# Patient Record
(demographics unavailable — no encounter records)

---

## 2024-10-15 NOTE — IMMUNIZATIONS
[Immunizations are up to date] : Immunizations are up to date [Records maintained by [facility]] : Records maintained by [facility]

## 2024-10-15 NOTE — CONSULT LETTER
[Dear  ___] : Dear ~EVIN, [Courtesy Letter:] : I had the pleasure of seeing your patient, [unfilled], in my office today. [Please see my note below.] : Please see my note below. [Consult Closing:] : Thank you very much for allowing me to participate in the care of this patient.  If you have any questions, please do not hesitate to contact me. [Sincerely,] : Sincerely, [FreeTextEntry2] : Jaylon Peck [address], [place] [address] [FreeTextEntry3] : Connie Sloan MD Attending Physician, Pediatric Rheumatology [facility] | [facility]

## 2024-10-15 NOTE — PHYSICAL EXAM
[PERRLA] : SHANON [S1, S2 Present] : S1, S2 present [Clear to auscultation] : clear to auscultation [Soft] : soft [NonTender] : non tender [Non Distended] : non distended [Normal Bowel Sounds] : normal bowel sounds [No Hepatosplenomegaly] : no hepatosplenomegaly [No Abnormal Lymph Nodes Palpated] : no abnormal lymph nodes palpated [Range Of Motion] : full range of motion [Intact Judgement] : intact judgement  [Insight Insight] : intact insight [Cranial nerves grossly intact] : cranial nerves grossly intact [Not Examined] : not examined [Acute distress] : no acute distress [Erythematous Conjunctiva] : nonerythematous conjunctiva [Erythematous Oropharynx] : nonerythematous oropharynx [Lesions] : no lesions [Murmurs] : no murmurs [Joint effusions] : no joint effusions [FreeTextEntry1] : well-appearing, very fidgety during exam [de-identified] : no signs of arthritis, no pain with ROM

## 2024-10-15 NOTE — HISTORY OF PRESENT ILLNESS
[FreeTextEntry1] : Velia (patient prefers '[place]') is a 12-year-old female (prefers he/they pronouns) who presents for evaluation of abnormal labs.   Velia reports ongoing fatigue for over 1 year. He reports sleeping well at night - on school nights, goes to bed between 9:30 - 10:30 PM, wakes up at 6 AM. Feels exhausted when he wakes up. On weekends, goes to bed around the same time but sleeps in between 9 - 11 AM. Uses computer/iPad before bed. No snoring. No caffeine use. Does not participate in sports or physical activities.  Also describes episodes of dizziness and body aches that come on suddenly. He describes them as sudden and overwhelming, where the entire body feels tense. Chest feels tight and there is some shortness of breath as well. Jimenez states it feels like an anxiety attack, but he denies any anxious feelings. Pain improves quickly, and just has remaining fatigue that sometimes prevents him from going to school. Of note, he is seeing a therapist for anxiety. Often these episodes occur in the morning before school. Jimenez reports not liking school much.   For the past 4 weeks, He has had intermittent fevers (last 1-3 days, Tmax 101) with associated fatigue and body aches. Initially, had a few days of fever with no URI symptoms - prescribed antibiotics for 1 week, which seemed to help per mother. Still having the episodes described as above and mother states that she will check for fever and feels warm and temperature reads as 101, but returns to normal within 30 minutes. Last fever was 101 -yesterday - resolved within 30 minutes without any anti-pyretics.   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 Labs done on 9/7/24: mild anemia (Hgb 11.2), YAMILKA 1:320. Remainder of labs normal/negative: BMP, lysozyme, vitamin A, ESR, vitamin B12, Lyme immunoblot (1 IgG band), MPO/PR3, ACE, QuantiFERON TB, total IgA, and TTG IgA. MRI/A Brain completed on 9/20/24 which was unremarkable.   No fever, headache, visual changes, mouth sores, cough, congestion, chest pain, difficulty breathing, nausea, vomiting, diarrhea, constipation, blood in the stool, abdominal pain, dysuria, hematuria, joint pain, joint swelling, morning stiffness, back pain, or rash.   Past Medical History: depression, anxiety (follows with a therapist)  Past Surgical History: None  Family History: osteoarthritis - mother; ?IBD - father, paternal grandfather  Social History: 8th grade. Lives with parents.  Medications: Zoloft 100 mg PO daily, multivitamin Allergies: NKDA Immunizations up-to-date

## 2024-10-15 NOTE — HISTORY OF PRESENT ILLNESS
[FreeTextEntry1] : Velia (patient prefers '[place]') is a 12-year-old female (prefers he/they pronouns) who presents for evaluation of abnormal labs.   Velia reports ongoing fatigue for over 1 year. He reports sleeping well at night - on school nights, goes to bed between 9:30 - 10:30 PM, wakes up at 6 AM. Feels exhausted when he wakes up. On weekends, goes to bed around the same time but sleeps in between 9 - 11 AM. Uses computer/iPad before bed. No snoring. No caffeine use. Does not participate in sports or physical activities.  Also describes episodes of dizziness and body aches that come on suddenly. He describes them as sudden and overwhelming, where the entire body feels tense. Chest feels tight and there is some shortness of breath as well. Jimenez states it feels like an anxiety attack, but he denies any anxious feelings. Pain improves quickly, and just has remaining fatigue that sometimes prevents him from going to school. Of note, he is seeing a therapist for anxiety. Often these episodes occur in the morning before school. Jimenez reports not liking school much.   For the past 4 weeks, He has had intermittent fevers (last 1-3 days, Tmax 101) with associated fatigue and body aches. Initially, had a few days of fever with no URI symptoms - prescribed antibiotics for 1 week, which seemed to help per mother. Still having the episodes described as above and mother states that she will check for fever and feels warm and temperature reads as 101, but returns to normal within 30 minutes. Last fever was 101 -yesterday - resolved within 30 minutes without any anti-pyretics.   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 Labs done on 9/7/24: mild anemia (Hgb 11.2), YAMILKA 1:320. Remainder of labs normal/negative: BMP, lysozyme, vitamin A, ESR, vitamin B12, Lyme immunoblot (1 IgG band), MPO/PR3, ACE, QuantiFERON TB, total IgA, and TTG IgA. MRI/A Brain completed on 9/20/24 which was unremarkable.   No fever, headache, visual changes, mouth sores, cough, congestion, chest pain, difficulty breathing, nausea, vomiting, diarrhea, constipation, blood in the stool, abdominal pain, dysuria, hematuria, joint pain, joint swelling, morning stiffness, back pain, or rash.   Past Medical History: depression, anxiety (follows with a therapist)  Past Surgical History: None  Family History: osteoarthritis - mother; ?IBD - father, paternal grandfather  Social History: 8th grade. Lives with parents.  Medications: Zoloft 100 mg PO daily, multivitamin Allergies: NKDA Immunizations up-to-date

## 2024-10-15 NOTE — CONSULT LETTER
[Dear  ___] : Dear ~EVIN, [Courtesy Letter:] : I had the pleasure of seeing your patient, [unfilled], in my office today. [Please see my note below.] : Please see my note below. [Consult Closing:] : Thank you very much for allowing me to participate in the care of this patient.  If you have any questions, please do not hesitate to contact me. [Sincerely,] : Sincerely, [FreeTextEntry2] : Jaylon Peck -[address] [place], [place] [address] [FreeTextEntry3] : Connie Sloan MD Attending Physician, Pediatric Rheumatology [facility] | [facility]

## 2024-10-15 NOTE — PHYSICAL EXAM
[PERRLA] : SHANON [S1, S2 Present] : S1, S2 present [Clear to auscultation] : clear to auscultation [Soft] : soft [NonTender] : non tender [Non Distended] : non distended [Normal Bowel Sounds] : normal bowel sounds [No Hepatosplenomegaly] : no hepatosplenomegaly [No Abnormal Lymph Nodes Palpated] : no abnormal lymph nodes palpated [Range Of Motion] : full range of motion [Intact Judgement] : intact judgement  [Insight Insight] : intact insight [Cranial nerves grossly intact] : cranial nerves grossly intact [Not Examined] : not examined [Acute distress] : no acute distress [Erythematous Conjunctiva] : nonerythematous conjunctiva [Erythematous Oropharynx] : nonerythematous oropharynx [Lesions] : no lesions [Murmurs] : no murmurs [Joint effusions] : no joint effusions [FreeTextEntry1] : well-appearing, very fidgety during exam [de-identified] : no signs of arthritis, no pain with ROM